# Patient Record
Sex: MALE | Race: BLACK OR AFRICAN AMERICAN | NOT HISPANIC OR LATINO | Employment: OTHER | URBAN - METROPOLITAN AREA
[De-identification: names, ages, dates, MRNs, and addresses within clinical notes are randomized per-mention and may not be internally consistent; named-entity substitution may affect disease eponyms.]

---

## 2017-07-10 PROBLEM — D70.9 NEUTROPENIA: Chronic | Status: ACTIVE | Noted: 2017-07-10

## 2017-07-18 DIAGNOSIS — G45.9 MINI STROKE: ICD-10-CM

## 2017-07-18 DIAGNOSIS — R26.9 GAIT DISORDER: ICD-10-CM

## 2017-07-18 DIAGNOSIS — G40.909 SEIZURE DISORDER: ICD-10-CM

## 2017-07-18 DIAGNOSIS — R63.4 UNINTENTIONAL WEIGHT LOSS: ICD-10-CM

## 2017-07-18 DIAGNOSIS — R53.81 PHYSICAL DEBILITY: Primary | ICD-10-CM

## 2017-07-18 DIAGNOSIS — G93.40 ENCEPHALOPATHY: ICD-10-CM

## 2017-07-24 PROBLEM — R60.0 EDEMA OF LOWER EXTREMITY: Status: ACTIVE | Noted: 2017-07-24

## 2017-07-24 PROBLEM — I67.82 TEMPORARY CEREBRAL VASCULAR DYSFUNCTION: Status: ACTIVE | Noted: 2017-07-24

## 2017-07-24 PROBLEM — F09 MILD COGNITIVE DISORDER: Status: ACTIVE | Noted: 2017-07-24

## 2017-07-24 PROBLEM — Q24.5 CORONARY ARTERY ABNORMALITY: Status: ACTIVE | Noted: 2017-07-24

## 2017-07-24 PROBLEM — R63.4 UNINTENDED WEIGHT LOSS: Status: ACTIVE | Noted: 2017-07-24

## 2017-07-24 PROBLEM — D72.819 LEUKOPENIA: Status: ACTIVE | Noted: 2017-07-24

## 2017-07-24 PROBLEM — Z78.9 NON-SMOKER: Status: ACTIVE | Noted: 2017-07-24

## 2017-07-24 PROBLEM — I83.90 VARICOSE VEINS OF LOWER EXTREMITY: Status: ACTIVE | Noted: 2017-07-24

## 2017-07-24 PROBLEM — Z95.0 PRESENCE OF CARDIAC PACEMAKER: Status: ACTIVE | Noted: 2017-07-24

## 2017-07-24 PROBLEM — I25.5 GENERALIZED ISCHEMIC MYOCARDIAL DYSFUNCTION: Status: ACTIVE | Noted: 2017-07-24

## 2017-10-19 ENCOUNTER — OFFICE VISIT (OUTPATIENT)
Dept: FAMILY MEDICINE | Facility: CLINIC | Age: 82
End: 2017-10-19
Payer: MEDICARE

## 2017-10-19 VITALS
RESPIRATION RATE: 16 BRPM | OXYGEN SATURATION: 96 % | SYSTOLIC BLOOD PRESSURE: 130 MMHG | DIASTOLIC BLOOD PRESSURE: 88 MMHG | BODY MASS INDEX: 18.01 KG/M2 | HEIGHT: 72 IN | HEART RATE: 70 BPM | WEIGHT: 133 LBS

## 2017-10-19 DIAGNOSIS — D72.819 LEUKOPENIA, UNSPECIFIED TYPE: ICD-10-CM

## 2017-10-19 DIAGNOSIS — R63.4 UNINTENDED WEIGHT LOSS: ICD-10-CM

## 2017-10-19 DIAGNOSIS — F09 MILD COGNITIVE DISORDER: ICD-10-CM

## 2017-10-19 DIAGNOSIS — I10 ESSENTIAL HYPERTENSION: ICD-10-CM

## 2017-10-19 DIAGNOSIS — R22.1 MASS OF RIGHT SIDE OF NECK: Primary | ICD-10-CM

## 2017-10-19 PROCEDURE — 90732 PPSV23 VACC 2 YRS+ SUBQ/IM: CPT | Mod: ,,, | Performed by: INTERNAL MEDICINE

## 2017-10-19 PROCEDURE — G0009 ADMIN PNEUMOCOCCAL VACCINE: HCPCS | Mod: ,,, | Performed by: INTERNAL MEDICINE

## 2017-10-19 PROCEDURE — 99214 OFFICE O/P EST MOD 30 MIN: CPT | Mod: ,,, | Performed by: INTERNAL MEDICINE

## 2017-10-19 PROCEDURE — G0008 ADMIN INFLUENZA VIRUS VAC: HCPCS | Mod: ,,, | Performed by: INTERNAL MEDICINE

## 2017-10-19 PROCEDURE — 90662 IIV NO PRSV INCREASED AG IM: CPT | Mod: ,,, | Performed by: INTERNAL MEDICINE

## 2017-10-19 RX ORDER — QUETIAPINE FUMARATE 25 MG/1
25 TABLET, FILM COATED ORAL NIGHTLY
Qty: 30 TABLET | Refills: 11 | Status: SHIPPED | OUTPATIENT
Start: 2017-10-19 | End: 2018-10-19

## 2017-10-19 RX ORDER — CEPHALEXIN 500 MG/1
500 CAPSULE ORAL EVERY 8 HOURS
Qty: 30 CAPSULE | Refills: 0 | Status: SHIPPED | OUTPATIENT
Start: 2017-10-19

## 2017-10-19 RX ORDER — CEFTRIAXONE 500 MG/1
500 INJECTION, POWDER, FOR SOLUTION INTRAMUSCULAR; INTRAVENOUS
Status: SHIPPED | OUTPATIENT
Start: 2017-10-19

## 2017-10-19 NOTE — PATIENT INSTRUCTIONS
When Your Child Has Swollen Lymph Nodes        Lymph nodes are located throughout the body. Some lymph nodes can be felt from outside the body (shaded areas).     Lymph nodes help the bodys immune system fight infection. These nodes are found throughout the body. Lymph nodes can swell due to illness or infection. They can also swell for unknown reasons. In most cases, swollen lymph nodes (also called swollen glands) arent a serious problem. They usually return to their original size with no treatment or when the illness or infection has passed.   What causes swollen lymph nodes?  Swollen lymph nodes can be caused by:  · Common illnesses, such as a cold or an ear infection  · Bacterial infections, such as strep throat  · Viral infections, such as mononucleosis  · Certain rare illnesses that affect the immune system  · Rarely, cancer  How is the cause of swollen lymph nodes diagnosed?  · The healthcare provider asks about your childs symptoms and health history.  · A physical exam is performed on your child. The healthcare provider will check the nodes in the neck, behind the ears, under the arms, and in the groin. These nodes can often be felt from outside the body when they are swollen. If an infection is suspected, the healthcare provider may order more tests as needed.  How are swollen lymph nodes treated?  · Treatment for swollen lymph nodes depends on the underlying cause. In most cases, no treatment is needed at all.  · Medicine can be prescribed by the healthcare provider to treat an infection. Your child should take all of the medicine, even if he or she starts feeling better.  · If lymph nodes are painful or tender, do the following at home to relieve your childs symptoms:   ¨ Give your child over-the-counter medicine, such as ibuprofen or acetaminophen, to treat pain and fever. Do not give ibuprofen to an infant 6 months of age or less, or to a child who is dehydrated or constantly vomiting. Do not  give aspirin to a child. This can put your child at risk of a serious illness called Reye syndrome.  ¨ Apply a warm compress to any painful or tender lymph nodes. Use an item such as a warm, clean washcloth. A bottle filled with warm water, or a potato warmed in a microwave and wrapped in a towel, can be used as a compress.  Call the healthcare provider  Contact your healthcare provider if your child has any of the following:  · Fever (see Fever and children, below)  · Your child has had a seizure caused by the fever  · Painful or tender swollen lymph nodes   · Lymph nodes that continue to grow in size or persist beyond 2 weeks  · A large lymph node that is very hard or doesn't seem to move under your fingers  Fever and children  Always use a digital thermometer to check your childs temperature. Never use a mercury thermometer.  For infants and toddlers, be sure to use a rectal thermometer correctly. A rectal thermometer may accidentally poke a hole in (perforate) the rectum. It may also pass on germs from the stool. Always follow the product makers directions for proper use. If you dont feel comfortable taking a rectal temperature, use another method. When you talk to your childs healthcare provider, tell him or her which method you used to take your childs temperature.  Here are guidelines for fever temperature. Ear temperatures arent accurate before 6 months of age. Dont take an oral temperature until your child is at least 4 years old.  Infant under 3 months old:  · Ask your childs healthcare provider how you should take the temperature.  · Rectal or forehead (temporal artery) temperature of 100.4°F (38°C) or higher, or as directed by the provider  · Armpit temperature of 99°F (37.2°C) or higher, or as directed by the provider  Child age 3 to 36 months:  · Rectal, forehead, or ear temperature of 102°F (38.9°C) or higher, or as directed by the provider  · Armpit (axillary) temperature of 101°F (38.3°C) or  higher, or as directed by the provider  Child of any age:  · Repeated temperature of 104°F (40°C) or higher, or as directed by the provider  · Fever that lasts more than 24 hours in a child under 2 years old. Or a fever that lasts for 3 days in a child 2 years or older.   Date Last Reviewed: 10/1/2016  © 9043-8285 [employer]. [address] [place] [address]. All rights reserved. This information is not intended as a substitute for professional medical care. Always follow your healthcare professional's instructions.

## 2017-10-20 NOTE — PROGRESS NOTES
Subjective:       Patient ID: Claudio Goel is a 82 y.o. male.    Chief Complaint: Neck Pain (swollen glands on right side of neck)    Mr. Preciado come to see me after a urinary and he is an 81-year-old man with multiple chronic medical problems to include pancytopenia, ischemic cardiomyopathy and when last checked he had ejection fraction of 15%. He was also getting IV dobutamine. He also has mild to moderate dementia. His wife is accompanying him who is his full caregiver. She states that he does not eat much courses lost some weight but his lungs being clear and he hasn't had any lower extremity edema. She has tapered down her his medications to the minimum. She states that with the dementia he moans continuously especially at night. In fact I witnessed the moaning, low pitched, without a bad mood. Patient kept saying that he had ago had ago. He wanted to get out of there.    The reason for coming today was that few weeks ago the wife noticed on his right neck swollen mass that was tender. He does not really complain so he has no additional complaints only to say that is tender when touched. Therefore he denies any postnasal drip, any sore throat, any fever, or ear pain. The wife feels that it has shrunk a little bit over the past couple of days.    She alluded to the fact that they will be moving back [facility] from [facility].      Review of Systems   Constitutional: Positive for unexpected weight change. Negative for activity change, diaphoresis, fatigue and fever.   HENT: Negative for congestion, ear pain, postnasal drip, sinus pressure, sore throat and trouble swallowing.         As per history of present illness.   Eyes: Negative for pain.   Respiratory: Negative for cough, chest tightness, shortness of breath and wheezing.    Cardiovascular: Negative for chest pain, palpitations and leg swelling.   Gastrointestinal: Negative for abdominal distention, abdominal pain, blood in stool, constipation and  diarrhea.   Endocrine: Negative for cold intolerance and heat intolerance.   Genitourinary: Negative for decreased urine volume, difficulty urinating, dysuria, flank pain, frequency and hematuria.   Musculoskeletal: Negative for arthralgias, back pain, joint swelling, myalgias and neck pain.   Skin: Negative for pallor, rash and wound.   Neurological: Negative for tremors, syncope, weakness, light-headedness, numbness and headaches.   Hematological: Negative for adenopathy.   Psychiatric/Behavioral: Negative for confusion, decreased concentration, hallucinations, self-injury, sleep disturbance and suicidal ideas. The patient is not nervous/anxious.        Past Medical History:   Diagnosis Date    Coronary artery disease     4 bypass surgeries    Hypertension     Seizures     Stroke        Past Surgical History:   Procedure Laterality Date    CARDIAC SURGERY         History reviewed. No pertinent family history.    Social History     Social History    Marital status:      Spouse name: N/A    Number of children: N/A    Years of education: N/A     Social History Main Topics    Smoking status: Never Smoker    Smokeless tobacco: None    Alcohol use No    Drug use: Unknown    Sexual activity: Not Asked     Other Topics Concern    None     Social History Narrative    None       Current Outpatient Prescriptions   Medication Sig Dispense Refill    aspirin 81 mg Tab Take 1 tablet by mouth once daily.       torsemide (DEMADEX) 20 MG Tab Take by mouth.      carvedilol (COREG) 6.25 MG tablet Take 6.25 mg by mouth 2 (two) times daily with meals.      carvedilol (COREG) 6.25 MG tablet Take by mouth.      cephALEXin (KEFLEX) 500 MG capsule Take 1 capsule (500 mg total) by mouth every 8 (eight) hours. 30 capsule 0    levetiracetam (KEPPRA) 500 MG Tab Take 500 mg by mouth 2 (two) times daily.      losartan (COZAAR) 50 MG tablet Take 50 mg by mouth once daily.      mometasone (NASONEX) 50 mcg/actuation  nasal spray 2 sprays by Nasal route once daily.      quetiapine (SEROQUEL) 25 MG Tab Take 1 tablet (25 mg total) by mouth every evening. 30 tablet 11     Current Facility-Administered Medications   Medication Dose Route Frequency Provider Last Rate Last Dose    cefTRIAXone injection 500 mg  500 mg Intramuscular 1 time in Clinic/HOD Francisca Palmer MD           Review of patient's allergies indicates:  No Known Allergies  Objective:      Physical Exam   Constitutional: He is oriented to person, place, and time. He appears well-developed and well-nourished. No distress.   HENT:   Head: Normocephalic and atraumatic.   Right Ear: External ear normal.   Left Ear: External ear normal.   Nose: Nose normal.   Mouth/Throat: Oropharynx is clear and moist.   White scant postnasal drip visualized. The patient was coughing on the office visit.   Eyes: Conjunctivae and EOM are normal. Right eye exhibits no discharge. Left eye exhibits no discharge. No scleral icterus.   Neck: Normal range of motion. Neck supple. No JVD present. No tracheal deviation present. Thyroid mass present. No thyromegaly present.   A few oblong nodules on the posterior cervical chain. They're soft fairly well demarcated with the largest being oblong and about 3.5 cm x 2 cm x 0.5 cm in depth.  There were a few others which are much smaller and not definitely attached. Patient states there were tender to palpation.   Cardiovascular: Normal rate, regular rhythm and intact distal pulses.  Exam reveals no gallop.    No murmur heard.  Decreased heart sounds.   Pulmonary/Chest: Effort normal and breath sounds normal. No respiratory distress. He has no wheezes. He has no rales.   Abdominal: Soft. Bowel sounds are normal. He exhibits no distension and no mass. There is no tenderness.   Musculoskeletal: Normal range of motion. He exhibits no edema or tenderness.   Lymphadenopathy:     He has no cervical adenopathy.   Neurological: He is alert and oriented to  person, place, and time.   Skin: Skin is warm and dry. No rash noted. He is not diaphoretic. No erythema.   Psychiatric: He has a normal mood and affect. His behavior is normal. Judgment and thought content normal.       Assessment:       1. Mass of right side of neck    2. Leukopenia, unspecified type    3. Mild cognitive disorder    4. Unintended weight loss    5. Essential hypertension        Plan:       Mass of right side of neck- Patient will get the ultrasound of the neck after the antibiotics if the mass is still present. Etiology is likely lymphadenitis with the point of entry either by scratching or a bug bite.  -     cefTRIAXone injection 500 mg; Inject 0.5 g (500 mg total) into the muscle one time.  -     cephALEXin (KEFLEX) 500 MG capsule; Take 1 capsule (500 mg total) by mouth every 8 (eight) hours.  Dispense: 30 capsule; Refill: 0  -     US Soft Tissue Head Neck Thyroid; Future; Expected date: 10/19/2017    Leukopenia, unspecified type-patient has not seen his oncologist in quite some time due to his disability.  -     CBC auto differential; Future; Expected date: 10/30/2017  -     Homocysteine, serum; Future; Expected date: 10/30/2017  -     Methylmalonic acid, serum; Future; Expected date: 10/30/2017    Mild to moderate cognitive disorder-with restlessness especially in the evenings when there trying to rest.  -     quetiapine (SEROQUEL) 25 MG Tab; Take 1 tablet (25 mg total) by mouth every evening.  Dispense: 30 tablet; Refill: 11    Unintended weight loss  -     Comprehensive metabolic panel; Future; Expected date: 10/30/2017  -     TSH; Future; Expected date: 10/30/2017    Essential hypertension  -     TSH; Future; Expected date: 10/30/2017  -     Urinalysis Microscopic; Future; Expected date: 10/30/2017    Other orders  -     Influenza - High Dose (65+) (PF) (IM)  -     Pneumococcal Polysaccharide Vaccine (23 Valent) (SQ/IM)    Time spent with the patient was 40 min and 50 percent of that was in  face to face contact

## 2017-10-26 ENCOUNTER — TELEPHONE (OUTPATIENT)
Dept: FAMILY MEDICINE | Facility: CLINIC | Age: 82
End: 2017-10-26

## 2017-10-26 DIAGNOSIS — R63.4 UNINTENDED WEIGHT LOSS: ICD-10-CM

## 2017-10-26 DIAGNOSIS — R26.9 GAIT DISORDER: Primary | ICD-10-CM

## 2017-10-26 DIAGNOSIS — I50.22 CHRONIC SYSTOLIC HEART FAILURE, ACC/AHA STAGE C: ICD-10-CM

## 2017-10-26 NOTE — TELEPHONE ENCOUNTER
Caretaker called and requested [facility] order for PT/OT? States she assists him with taking steps with a walker and wants PT/OT for assistance with walker/walking training.  Can he have an order for that.  He does not currently have HH at this time.

## 2017-10-31 PROBLEM — I50.22 CHRONIC SYSTOLIC HEART FAILURE, ACC/AHA STAGE C: Status: ACTIVE | Noted: 2017-10-31

## 2017-10-31 NOTE — TELEPHONE ENCOUNTER
[facility] is not in our referral system for home health   So I typed it in   Fax my last note and med list to [facility]

## 2017-11-03 ENCOUNTER — OUTSIDE PLACE OF SERVICE (OUTPATIENT)
Dept: FAMILY MEDICINE | Facility: CLINIC | Age: 82
End: 2017-11-03
Payer: MEDICARE

## 2017-11-03 PROCEDURE — G0180 MD CERTIFICATION HHA PATIENT: HCPCS | Mod: ,,, | Performed by: INTERNAL MEDICINE

## 2017-11-07 ENCOUNTER — TELEPHONE (OUTPATIENT)
Dept: FAMILY MEDICINE | Facility: CLINIC | Age: 82
End: 2017-11-07

## 2017-11-07 NOTE — TELEPHONE ENCOUNTER
Puja, nurse, with [facility] called. She went to see pt on Friday.  Pt had a fall Thursday evening.  States his neuro is intact, denied pain, some swelling to his right sife of face which the wife states was already there.  Nurse wanted you to know.  Any orders?  Or can call her @ [phone]

## 2017-11-09 NOTE — TELEPHONE ENCOUNTER
Yes if the swelling is still there on the right side of the neck, inform the home health nurse as well as the wife that he needs to go on ahead and get the ultrasound of his neck that we ordered on October 19 office visit.

## 2017-11-10 NOTE — TELEPHONE ENCOUNTER
Placed call to ANTOLIN Luo w/[facility]. No answer but LVM.  Spoke with pts wife. Pt is scheduled for the  on the 15th at 10am.

## 2017-11-15 LAB
ALBUMIN SERPL-MCNC: 3.4 G/DL (ref 3.1–4.7)
ALP SERPL-CCNC: 73 IU/L (ref 40–104)
ALT (SGPT): 10 IU/L (ref 3–33)
AST SERPL-CCNC: 20 IU/L (ref 10–40)
BASOPHILS NFR BLD: 0 K/UL (ref 0–0.2)
BASOPHILS NFR BLD: 1.2 %
BILIRUB SERPL-MCNC: 1.6 MG/DL (ref 0.3–1)
BUN SERPL-MCNC: 16 MG/DL (ref 8–20)
CALCIUM SERPL-MCNC: 8.7 MG/DL (ref 7.7–10.4)
CHLORIDE: 103 MMOL/L (ref 98–110)
CO2 SERPL-SCNC: 27.4 MMOL/L (ref 22.8–31.6)
CREATININE: 1.29 MG/DL (ref 0.6–1.4)
EOSINOPHIL NFR BLD: 0.2 K/UL (ref 0–0.7)
EOSINOPHIL NFR BLD: 4.7 %
ERYTHROCYTE [DISTWIDTH] IN BLOOD BY AUTOMATED COUNT: 19 % (ref 11.7–14.9)
GLUCOSE: 93 MG/DL (ref 70–99)
GRAN #: 1.2 K/UL (ref 1.4–6.5)
GRAN%: 37.3 %
HCT VFR BLD AUTO: 37.7 % (ref 39–55)
HGB BLD-MCNC: 11.8 G/DL (ref 14–16)
IMMATURE GRANS (ABS): 0 K/UL (ref 0–1)
IMMATURE GRANULOCYTES: 0.3 %
LYMPH #: 1.4 K/UL (ref 1.2–3.4)
LYMPH%: 43.8 %
MCH RBC QN AUTO: 27.4 PG (ref 25–35)
MCHC RBC AUTO-ENTMCNC: 31.3 G/DL (ref 31–36)
MCV RBC AUTO: 87.5 FL (ref 80–100)
MONO #: 0.4 K/UL (ref 0.1–0.6)
MONO%: 12.7 %
NUCLEATED RBCS: 0 %
PLATELET # BLD AUTO: 186 K/UL (ref 140–440)
PMV BLD AUTO: 9.2 FL (ref 8.8–12.7)
POTASSIUM SERPL-SCNC: 4.2 MMOL/L (ref 3.5–5)
PROT SERPL-MCNC: 7.6 G/DL (ref 6–8.2)
RBC # BLD AUTO: 4.31 M/UL (ref 4.3–5.9)
SODIUM: 138 MMOL/L (ref 134–144)
TSH SERPL DL<=0.005 MIU/L-ACNC: 4.62 ULU/ML (ref 0.3–5.6)
WBC # BLD AUTO: 3.2 K/UL (ref 5–10)

## 2017-11-16 LAB — HOMOCYSTEINE: 18.3 UMOL/L (ref 0–15)

## 2017-11-18 DIAGNOSIS — D70.8 OTHER NEUTROPENIA: Primary | ICD-10-CM

## 2017-11-18 DIAGNOSIS — R79.83 HOMOCYSTEINEMIA: Primary | ICD-10-CM

## 2017-11-18 DIAGNOSIS — R22.1 MASS OF RIGHT SIDE OF NECK: ICD-10-CM

## 2017-11-18 RX ORDER — FOLIC ACID 1 MG/1
1 TABLET ORAL DAILY
Qty: 30 TABLET | Refills: 4 | Status: SHIPPED | OUTPATIENT
Start: 2017-11-18 | End: 2018-11-18

## 2017-11-20 ENCOUNTER — TELEPHONE (OUTPATIENT)
Dept: FAMILY MEDICINE | Facility: CLINIC | Age: 82
End: 2017-11-20

## 2017-11-20 NOTE — TELEPHONE ENCOUNTER
Attempted to call and speak w/pts wife at home and cell ph #'s, no answer and both mail boxes were full. Attempted to place call to ANTOLIN Luo w[facility]  to relay the msg re: the lab work and the test showing a mass on pts neck, LVM for her to call us asap

## 2017-11-21 NOTE — TELEPHONE ENCOUNTER
----- Message from Francisca Palmer MD sent at 11/18/2017  8:10 PM CST -----  Lab work looks stable -anemia and leukopenia however he needs the ct scan and surgical follow up for his neck mass

## 2017-11-21 NOTE — TELEPHONE ENCOUNTER
----- Message from Francisca Palmer MD sent at 11/18/2017  8:03 PM CST -----  The mass in his neck is likely cancerous. Will place order for ct of the neck and referral to gen surgery (they live in [facility])

## 2017-12-06 ENCOUNTER — TELEPHONE (OUTPATIENT)
Dept: FAMILY MEDICINE | Facility: CLINIC | Age: 82
End: 2017-12-06

## 2017-12-06 NOTE — TELEPHONE ENCOUNTER
pts wife called. States went to [facility]. Went home on Hospice and wants to switch him back to [facility]  if you can do a referral pls

## 2017-12-11 ENCOUNTER — TELEPHONE (OUTPATIENT)
Dept: FAMILY MEDICINE | Facility: CLINIC | Age: 82
End: 2017-12-11

## 2017-12-11 DIAGNOSIS — F01.50 VASCULAR DEMENTIA WITHOUT BEHAVIORAL DISTURBANCE: Primary | ICD-10-CM

## 2017-12-11 DIAGNOSIS — I42.4: ICD-10-CM

## 2017-12-11 DIAGNOSIS — R63.4 UNINTENDED WEIGHT LOSS: ICD-10-CM

## 2017-12-11 DIAGNOSIS — R22.1 MASS OF RIGHT SIDE OF NECK: ICD-10-CM

## 2017-12-11 DIAGNOSIS — N18.2 CKD (CHRONIC KIDNEY DISEASE) STAGE 2, GFR 60-89 ML/MIN: ICD-10-CM

## 2017-12-11 NOTE — TELEPHONE ENCOUNTER
Wife called again wanting home health referral and wants to take him off of hospice. Can you refer to [facility]?

## 2017-12-12 PROBLEM — I42.4: Status: ACTIVE | Noted: 2017-12-12

## 2017-12-12 PROBLEM — F01.50 VASCULAR DEMENTIA WITHOUT BEHAVIORAL DISTURBANCE: Status: ACTIVE | Noted: 2017-07-24

## 2017-12-12 NOTE — TELEPHONE ENCOUNTER
Will correction but please remind Ms. Prather that if the neck mass is still they are it is most likely cancer and therefore hospice would be the way to go not only for that but for his end-stage heart failure.  Send our med list and last progress note if needed

## 2018-05-24 ENCOUNTER — TELEPHONE (OUTPATIENT)
Dept: FAMILY MEDICINE | Facility: CLINIC | Age: 83
End: 2018-05-24

## 2018-05-24 NOTE — TELEPHONE ENCOUNTER
Spoke with pts wife. [facility] discussed with Ms Goel earlier in the week needed to be filled out. Dr Palmer would fill out if sent same day.  Papers arrived by fax yesterday at 17:17 (6:17pm) after clinic close.   Explained to Ms Duverney that Dr Palmer out of office today thru Jun 4th and earliest papers can be filled out is June 5th. Ms Duverney to call [facility] for an exception re deadline for the paperwork and will call us back to let us know.

## 2018-10-15 NOTE — TELEPHONE ENCOUNTER
Attempted to notify pts wife, no answer on cell or home phone and both mail boxes full. Unable to LVM   n/a
